# Patient Record
Sex: FEMALE | Race: OTHER | HISPANIC OR LATINO
[De-identification: names, ages, dates, MRNs, and addresses within clinical notes are randomized per-mention and may not be internally consistent; named-entity substitution may affect disease eponyms.]

---

## 2019-05-17 PROBLEM — Z00.00 ENCOUNTER FOR PREVENTIVE HEALTH EXAMINATION: Status: ACTIVE | Noted: 2019-05-17

## 2019-06-03 ENCOUNTER — APPOINTMENT (OUTPATIENT)
Dept: GASTROENTEROLOGY | Facility: CLINIC | Age: 49
End: 2019-06-03

## 2021-09-21 ENCOUNTER — APPOINTMENT (OUTPATIENT)
Dept: RHEUMATOLOGY | Facility: CLINIC | Age: 51
End: 2021-09-21
Payer: COMMERCIAL

## 2021-09-21 VITALS
WEIGHT: 131 LBS | HEIGHT: 62.5 IN | BODY MASS INDEX: 23.5 KG/M2 | SYSTOLIC BLOOD PRESSURE: 110 MMHG | DIASTOLIC BLOOD PRESSURE: 70 MMHG

## 2021-09-21 DIAGNOSIS — Z78.9 OTHER SPECIFIED HEALTH STATUS: ICD-10-CM

## 2021-09-21 DIAGNOSIS — E78.5 HYPERLIPIDEMIA, UNSPECIFIED: ICD-10-CM

## 2021-09-21 DIAGNOSIS — F17.200 NICOTINE DEPENDENCE, UNSPECIFIED, UNCOMPLICATED: ICD-10-CM

## 2021-09-21 DIAGNOSIS — F32.9 MAJOR DEPRESSIVE DISORDER, SINGLE EPISODE, UNSPECIFIED: ICD-10-CM

## 2021-09-21 PROCEDURE — 99204 OFFICE O/P NEW MOD 45 MIN: CPT

## 2021-09-21 RX ORDER — ROSUVASTATIN CALCIUM 20 MG/1
20 TABLET, FILM COATED ORAL
Refills: 0 | Status: ACTIVE | COMMUNITY

## 2021-09-21 RX ORDER — FLUOXETINE HYDROCHLORIDE 20 MG/1
20 CAPSULE ORAL
Refills: 0 | Status: ACTIVE | COMMUNITY

## 2021-09-21 RX ORDER — PREDNISONE 5 MG/1
5 TABLET ORAL
Qty: 21 | Refills: 0 | Status: COMPLETED | COMMUNITY
Start: 2021-09-21 | End: 2021-09-28

## 2021-09-21 NOTE — REVIEW OF SYSTEMS
[Red Eyes] : red eyes [Joint Pain] : joint pain [Joint Stiffness] : joint stiffness [Fever] : no fever [Chills] : no chills [Eye Pain] : no eye pain [Shortness Of Breath] : no shortness of breath [Cough] : no cough [Abdominal Pain] : no abdominal pain [Diarrhea] : no diarrhea [Dysuria] : no dysuria [Skin Lesions] : no skin lesions [FreeTextEntry6] : No pleuritic C/P

## 2021-09-21 NOTE — PHYSICAL EXAM
[General Appearance - Alert] : alert [General Appearance - In No Acute Distress] : in no acute distress [General Appearance - Well Nourished] : well nourished [General Appearance - Well Developed] : well developed [Sclera] : the sclera and conjunctiva were normal [Auscultation Breath Sounds / Voice Sounds] : lungs were clear to auscultation bilaterally [Cervical Lymph Nodes Enlarged Posterior Bilaterally] : posterior cervical [Left] : on the left [Tender] : tender [Rubbery] : rubbery [] : no rash [Motor Exam] : the motor exam was normal [FreeTextEntry1] : There is bilateral GH tenderenss without palpable swelling. There is joint line tenderness of the elbows. There is scattered MCP and PIP joint tenderness without palpable swelling. There is pain on extreme ROM of the hips bilaterally. There is joint line tenderness of the ankles without bogginess or swelling. There is MTP tenderness bilaterally.

## 2021-09-21 NOTE — HISTORY OF PRESENT ILLNESS
[FreeTextEntry1] : Patient is referred for rheumatology evaluation because of about one year of diffuse joint pain with AM stiffness and daytime gelling. Joint most involved are the hips, hands, and low back. There has been no rash or skin photosensitivity, fever, serositis, Raynaud's, hair loss or other associated symptoms. There have been no GI or  symptoms but has had some recent eye redness and irritation at times.

## 2021-09-22 LAB
ALBUMIN SERPL ELPH-MCNC: 4.5 G/DL
ALP BLD-CCNC: 109 U/L
ALT SERPL-CCNC: 29 U/L
ANION GAP SERPL CALC-SCNC: 14 MMOL/L
AST SERPL-CCNC: 20 U/L
BASOPHILS # BLD AUTO: 0.03 K/UL
BASOPHILS NFR BLD AUTO: 0.4 %
BILIRUB SERPL-MCNC: 0.2 MG/DL
BUN SERPL-MCNC: 14 MG/DL
CALCIUM SERPL-MCNC: 9.4 MG/DL
CHLORIDE SERPL-SCNC: 103 MMOL/L
CO2 SERPL-SCNC: 23 MMOL/L
CREAT SERPL-MCNC: 0.62 MG/DL
CRP SERPL-MCNC: <3 MG/L
EOSINOPHIL # BLD AUTO: 0.14 K/UL
EOSINOPHIL NFR BLD AUTO: 2.1 %
ERYTHROCYTE [SEDIMENTATION RATE] IN BLOOD BY WESTERGREN METHOD: 14 MM/HR
GLUCOSE SERPL-MCNC: 91 MG/DL
HCT VFR BLD CALC: 42.9 %
HGB BLD-MCNC: 14.1 G/DL
IMM GRANULOCYTES NFR BLD AUTO: 0.1 %
LYMPHOCYTES # BLD AUTO: 2.18 K/UL
LYMPHOCYTES NFR BLD AUTO: 32.1 %
MAN DIFF?: NORMAL
MCHC RBC-ENTMCNC: 31.6 PG
MCHC RBC-ENTMCNC: 32.9 GM/DL
MCV RBC AUTO: 96.2 FL
MONOCYTES # BLD AUTO: 0.39 K/UL
MONOCYTES NFR BLD AUTO: 5.7 %
NEUTROPHILS # BLD AUTO: 4.04 K/UL
NEUTROPHILS NFR BLD AUTO: 59.6 %
PLATELET # BLD AUTO: 191 K/UL
POTASSIUM SERPL-SCNC: 4.3 MMOL/L
PROT SERPL-MCNC: 6.7 G/DL
RBC # BLD: 4.46 M/UL
RBC # FLD: 12.8 %
RHEUMATOID FACT SER QL: <10 IU/ML
SODIUM SERPL-SCNC: 140 MMOL/L
WBC # FLD AUTO: 6.79 K/UL

## 2021-09-27 LAB
ANA SER IF-ACNC: NEGATIVE
CCP AB SER IA-ACNC: <8 UNITS
DSDNA AB SER-ACNC: <12 IU/ML
ENA RNP AB SER IA-ACNC: <0.2 AL
ENA SM AB SER IA-ACNC: <0.2 AL
ENA SS-A AB SER IA-ACNC: <0.2 AL
ENA SS-B AB SER IA-ACNC: <0.2 AL
HISTONE AB SER QL: 0.3 UNITS
RF+CCP IGG SER-IMP: NEGATIVE

## 2021-10-20 ENCOUNTER — APPOINTMENT (OUTPATIENT)
Dept: RHEUMATOLOGY | Facility: CLINIC | Age: 51
End: 2021-10-20
Payer: COMMERCIAL

## 2021-10-20 VITALS
BODY MASS INDEX: 23.5 KG/M2 | SYSTOLIC BLOOD PRESSURE: 110 MMHG | OXYGEN SATURATION: 99 % | HEIGHT: 62.5 IN | WEIGHT: 131 LBS | TEMPERATURE: 99.1 F | HEART RATE: 71 BPM | DIASTOLIC BLOOD PRESSURE: 62 MMHG

## 2021-10-20 PROCEDURE — 99214 OFFICE O/P EST MOD 30 MIN: CPT

## 2021-10-20 NOTE — HISTORY OF PRESENT ILLNESS
[FreeTextEntry1] : Patient reports she sustained about 80% improvement while on prednisone, and after stopping developed pain in the right hip, but other pain has not recurred as yet.

## 2021-10-20 NOTE — PHYSICAL EXAM
[General Appearance - Alert] : alert [General Appearance - In No Acute Distress] : in no acute distress [General Appearance - Well Developed] : well developed [General Appearance - Well Nourished] : well nourished [Cervical Lymph Nodes Enlarged Posterior Bilaterally] : posterior cervical [Cervical Lymph Nodes Enlarged Anterior Bilaterally] : anterior cervical [] : no rash [Motor Exam] : the motor exam was normal [FreeTextEntry1] : There is pain on ROM of the right hip. No other active synovits is noted.

## 2022-01-12 ENCOUNTER — APPOINTMENT (OUTPATIENT)
Dept: RHEUMATOLOGY | Facility: CLINIC | Age: 52
End: 2022-01-12
Payer: MEDICAID

## 2022-01-12 PROCEDURE — 99214 OFFICE O/P EST MOD 30 MIN: CPT

## 2022-01-12 RX ORDER — PREDNISONE 5 MG/1
5 TABLET ORAL
Qty: 120 | Refills: 1 | Status: DISCONTINUED | COMMUNITY
Start: 2021-10-20 | End: 2022-01-12

## 2022-01-12 NOTE — HISTORY OF PRESENT ILLNESS
[FreeTextEntry1] : Patient reports that after tapering off prednisone joint symptoms did not recur. She ran out of [facility] [place] about one wee ago and did not call for another renewal, and in one week she is noticing a mild recurrence of joint symptoms.

## 2022-01-12 NOTE — PHYSICAL EXAM
[General Appearance - Alert] : alert [General Appearance - In No Acute Distress] : in no acute distress [General Appearance - Well Nourished] : well nourished [General Appearance - Well Developed] : well developed [] : no rash [Motor Exam] : the motor exam was normal [Cervical Lymph Nodes Enlarged Posterior Bilaterally] : posterior cervical [Left] : on the left [Tender] : tender [Rubbery] : rubbery [FreeTextEntry1] : There is pain on ROM of the right hip. No other active synovits is noted.

## 2022-02-08 ENCOUNTER — APPOINTMENT (OUTPATIENT)
Dept: NEUROSURGERY | Facility: CLINIC | Age: 52
End: 2022-02-08

## 2022-02-15 ENCOUNTER — APPOINTMENT (OUTPATIENT)
Dept: NEUROSURGERY | Facility: CLINIC | Age: 52
End: 2022-02-15

## 2022-03-08 ENCOUNTER — APPOINTMENT (OUTPATIENT)
Dept: NEUROSURGERY | Facility: CLINIC | Age: 52
End: 2022-03-08

## 2022-03-29 ENCOUNTER — APPOINTMENT (OUTPATIENT)
Dept: NEUROSURGERY | Facility: CLINIC | Age: 52
End: 2022-03-29
Payer: MEDICAID

## 2022-03-29 PROCEDURE — 99205 OFFICE O/P NEW HI 60 MIN: CPT

## 2022-03-29 NOTE — DATA REVIEWED
[de-identified] : MRI CERVICAL SPINE WITHOUT CONTRAST: 1/4/22: MR CERVICAL SPINE WITHOUT IV CONTRAST \par  \par Noncontrast brain MRI with sagittal T1, FLAIR, axial T1, FLAIR, T2, \par gradient-echo, diffusion and coronal T2 images. \par Noncontrast MRI cervical spine with sagittal T1, T2, STIR and axial \par gradient-echo images. \par History: Chiari malformation. Prior study outside head CT 11/14/2021. \par   \par MRI brain findings: Low, beak cerebellar tonsils consistent with Chiari I \par malformation. Relatively tight appearance at the level of the foramen magnum \par with paucity of cerebrospinal fluid adjacent to the brainstem anteriorly and \par cerebellar tonsils posteriorly. Cerebellar tonsils extend 1.3 cm below the \par foramen magnum. Also demonstrated basilar invagination with high positioning of \par the odontoid extending 7.5 mm superior to Huron's line. There is a short \par appearance of the clivus on sagittal images consistent with developmental \par dysplastic change. \par   \par No evidence intracranial mass or hydrocephalus. Faint/minimal increased T2 \par signal right anterior chantale suggesting minimal small vessel ischemic change \par versus flow related artifact. Diffusion images show no acute stroke. Midline \par structures intact. Grossly unremarkable vascular flow voids skull base region. \par Small maxillary and ethmoid sinus retention cysts. \par   \par MRI BRAIN IMPRESSION: Chiari I malformation with low, beaked cerebellar tonsils. \par Basilar invagination. Tight appearance foramen magnum as described. \par   \par Additional findings as described. \par   \par Cervical spine findings: Again noted low cerebellar tonsils consistent with \par Chiari I malformation and high positioning of the odontoid. No evidence focal \par osseous or spinal cord lesion. There is a wide cervical spinal canal with \par capacious appearance thecal sac especially superiorly. Suggested mild osseous \par dysplastic changes C2 vertebral body on axial images. \par   \par Scattered mild multilevel endplate degenerative changes. Disc space heights \par adequately maintained. \par   \par C2-3 no herniated disc or spinal canal stenosis. \par   \par C3-4 and C4-5 mild disc bulges. \par   \par C5-6 disc bulge/spondylosis with indentation ventral thecal sac. \par   \par C6-7 disc bulge/spondylosis with indentation ventral thecal sac. \par   \par C7-T1 mild disc bulge. \par   \par CERVICAL SPINE IMPRESSION: Chiari I malformation and basilar invagination. \par   \par Developmentally wide cervical spinal canal and capacious thecal sac. Probable \par dysplastic changes C2 vertebral body. \par   \par No evidence cervical spinal cord syrinx. \par   \par Scattered degenerative disc disease. \par   \par MRI BRAIN WITH AND WITHOUT CONTRAST: 1/4/22: MR BRAIN WITHOUT IV CONTRAST \par  \par Noncontrast brain MRI with sagittal T1, FLAIR, axial T1, FLAIR, T2, \par gradient-echo, diffusion and coronal T2 images. \par Noncontrast MRI cervical spine with sagittal T1, T2, STIR and axial \par gradient-echo images. \par History: Chiari malformation. Prior study outside head CT 11/14/2021. \par   \par MRI brain findings: Low, beak cerebellar tonsils consistent with Chiari I \par malformation. Relatively tight appearance at the level of the foramen magnum \par with paucity of cerebrospinal fluid adjacent to the brainstem anteriorly and \par cerebellar tonsils posteriorly. Cerebellar tonsils extend 1.3 cm below the \par foramen magnum. Also demonstrated basilar invagination with high positioning of \par the odontoid extending 7.5 mm superior to Huron's line. There is a short \par appearance of the clivus on sagittal images consistent with developmental \par dysplastic change. \par   \par No evidence intracranial mass or hydrocephalus. Faint/minimal increased T2 \par signal right anterior chantale suggesting minimal small vessel ischemic change \par versus flow related artifact. Diffusion images show no acute stroke. Midline \par structures intact. Grossly unremarkable vascular flow voids skull base region. \par Small maxillary and ethmoid sinus retention cysts. \par   \par MRI BRAIN IMPRESSION: Chiari I malformation with low, beaked cerebellar tonsils. \par Basilar invagination. Tight appearance foramen magnum as described. \par   \par Additional findings as described. \par   \par Cervical spine findings: Again noted low cerebellar tonsils consistent with \par Chiari I malformation and high positioning of the odontoid. No evidence focal \par osseous or spinal cord lesion. There is a wide cervical spinal canal with \par capacious appearance thecal sac especially superiorly. Suggested mild osseous \par dysplastic changes C2 vertebral body on axial images. \par   \par Scattered mild multilevel endplate degenerative changes. Disc space heights \par adequately maintained. \par   \par C2-3 no herniated disc or spinal canal stenosis. \par   \par C3-4 and C4-5 mild disc bulges. \par   \par C5-6 disc bulge/spondylosis with indentation ventral thecal sac. \par   \par C6-7 disc bulge/spondylosis with indentation ventral thecal sac. \par   \par C7-T1 mild disc bulge. \par   \par CERVICAL SPINE IMPRESSION: Chiari I malformation and basilar invagination. \par   \par Developmentally wide cervical spinal canal and capacious thecal sac. Probable \par dysplastic changes C2 vertebral body. \par   \par No evidence cervical spinal cord syrinx. \par   \par Scattered degenerative disc disease.

## 2022-03-29 NOTE — HISTORY OF PRESENT ILLNESS
[de-identified] : Ms. Polanco presents in neurosurgical consultation at the request of. Dr. Jesenia Alejo. She has a PMHx of nontoxic uninodular goiter. hyperlipidemia, depression, anxiety, and current smoker ( 3 cigarettes a day for the last 30 years). In November,  patient had fallen while going down the stairs and was evaluated in the emergency department at [facility]. As per the patient , she reported headaches shortly after her fall and CT head demonstrated an incidental finding of Chiari I malformation and no evidence of hemorrhage or perfusion deficit. We currently do not have these images or reports. She followed up outpatient with neurologist, Dr. Evelio Shelley, which prompted further imaging, MRI brain/C spine from 1/4/22, detailed below. \par \par Today she endorses occasional mild headaches located predominantly in the occipital region that are self limited. She developed bilateral hand paresthesias approxiimately two weeks ago which seem to be more prevalent in the morning with gradual improvement throughout the day. She states that she feels these symptoms are more "psychological" because she is aware of her imaging findings. There are no particular aggravating or alleviating factors. They do not disrupt her activities of daily living. She denies headaches or paresthesias associated with Valsalva maneuvers, hearing loss, blurry vision, visual disturbances, or gait instability. \par \par

## 2022-03-29 NOTE — ASSESSMENT
[FreeTextEntry1] : Ms. 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 She endorses mild, transient headaches and new, mild bilateral hand paresthesias, neither of which are Valsalva-related in nature. MRI brain reviewed independently by me demonstrates evidence for a Chiari I malformation with approximately 1.3mm of tonsillar descent with concomitant imaging evidence for basilar invagination. Ventral CSF is maintained at the foramen magnum. There is no evidence for syringomyelia. \par \par Natural history reviewed.Alternative management strategies discussed. I did explain that her mild headache syndrome is atypical for symptomatic Chiari malformation, and therefore I cannot reliably predict improvement in her symptoms with Chiari decompression. At this time I have recommended obtaining an MRI brain with and without gadolinium with MR brain CSF flow study, as well as a surveillance MRI cervical spine without gadolinium in 6 months with an appointment to follow. The patient understands treatment strategies and has agreed to this plan. \par \par I have asked the patient to contact me for any symptomatic development or progression in the interim at which time we can obtain expedited follow up imaging.\par \par A total of 60 minutes were spent relative to this encounter.\par \par \par \par

## 2022-08-01 ENCOUNTER — APPOINTMENT (OUTPATIENT)
Dept: THORACIC SURGERY | Facility: CLINIC | Age: 52
End: 2022-08-01

## 2022-08-01 VITALS — BODY MASS INDEX: 22.49 KG/M2 | WEIGHT: 135 LBS | HEIGHT: 65 IN

## 2022-08-01 PROCEDURE — G0296 VISIT TO DETERM LDCT ELIG: CPT

## 2022-08-01 NOTE — HISTORY OF PRESENT ILLNESS
[Current] : current smoker [_____ pack-years] : [unfilled] pack-years [TextBox_13] : Referred by Maria Fernanda Ambrosio\par \par DAMIEN GALLEGOS had telephonic visit for a review of eligibility and discussion of the Low dose CT lung cancer screening program. A telephonic visit occurred due to the patient not having access to a smart phone or a computer for an audio/visual visit.  The following was reviewed and confirmed the patient meets screening eligibility criteria.\par -Age 52 year\par Smoking Status:\par -Current smoker\par Pt smoked 12-15 cigarettes for 23 years, then 1 PPD for 1 year, then 5 cigarettes per day for 10 years.\par -Number of pack years smokin\par \par \par Ms. GALLEGOS   denies any signs or symptoms of lung cancer including new cough, changing cough, hemoptysis, and unintentional weight loss. \par \par Ms. GALLEGOS  has history of goiter and inflammatory polyarthopathy. She denies any personal history of lung cancer. Reports no lung cancer in a 1st degree relative. Reports no lung cancer in a 2nd degree relative. Denies any history of lung disease. Denies any  history of  occupational exposures.

## 2022-08-01 NOTE — ASSESSMENT
[Discussed Risks and Advised to Quit Smoking] : Discussed risks and advised to quit smoking [Declined] : Patient has declined cessation intervention [Pre-contemplation] : Pre-contemplation: The patient is not thinking about quitting smoking in the next 6 months [de-identified] : Pt is not ready to quit smoking cigarettes at this time.

## 2022-08-01 NOTE — PLAN
[Smoking Cessation] : smoking cessation [FreeTextEntry1] : Plan:\par \par -Low dose CT chest for lung cancer screening. Maria Fernanda Ambrosio FNP ordered the low dose CT.      \par \par [facility] at [phone]. Unable to reach [facility]. LVM with referrals- need RX and auth faxed to [phone]. DOS provided.\par \par -Follow up with patient and her referring provider after her LDCT results have been reviewed by the multidisciplinary clinical team, if needed.\par \par -Encouraged smoking cessation.\par \par Pt declines referral to CTC or CCX.\par \par Should I screen? tool utilized. 6 Year risk of lung cancer is   0.4%. \par \par Patient wishes to proceed with screening.\par \par Engaged in discussion regarding risks of screening during Covid-19 pandemic and precautions that are being used  to reduce exposure.\par \par Engaged in shared decision making with Ms. GALLEGOS . Answered all questions. She verbalized understanding and agreement. She knows to call back with and questions or concerns.\par

## 2022-08-29 DIAGNOSIS — G93.5 COMPRESSION OF BRAIN: ICD-10-CM

## 2022-09-02 ENCOUNTER — NON-APPOINTMENT (OUTPATIENT)
Age: 52
End: 2022-09-02

## 2022-10-06 ENCOUNTER — APPOINTMENT (OUTPATIENT)
Dept: RHEUMATOLOGY | Facility: CLINIC | Age: 52
End: 2022-10-06

## 2022-10-21 ENCOUNTER — APPOINTMENT (OUTPATIENT)
Dept: PULMONOLOGY | Facility: CLINIC | Age: 52
End: 2022-10-21

## 2022-10-21 VITALS
WEIGHT: 135 LBS | HEART RATE: 67 BPM | TEMPERATURE: 98 F | OXYGEN SATURATION: 97 % | DIASTOLIC BLOOD PRESSURE: 70 MMHG | BODY MASS INDEX: 22.49 KG/M2 | HEIGHT: 65 IN | SYSTOLIC BLOOD PRESSURE: 120 MMHG

## 2022-10-21 PROCEDURE — 99204 OFFICE O/P NEW MOD 45 MIN: CPT

## 2022-10-21 PROCEDURE — 99407 BEHAV CHNG SMOKING > 10 MIN: CPT

## 2022-10-21 RX ORDER — VARENICLINE 0.5 MG/1
0.5 TABLET, FILM COATED ORAL
Qty: 11 | Refills: 0 | Status: ACTIVE | COMMUNITY
Start: 2022-10-21 | End: 1900-01-01

## 2022-10-21 RX ORDER — VARENICLINE 1 MG/1
1 TABLET, FILM COATED ORAL TWICE DAILY
Qty: 60 | Refills: 2 | Status: ACTIVE | COMMUNITY
Start: 2022-10-21 | End: 1900-01-01

## 2022-10-21 NOTE — PHYSICAL EXAM
[No Acute Distress] : no acute distress [Normal Appearance] : normal appearance [No Neck Mass] : no neck mass [Normal Rate/Rhythm] : normal rate/rhythm [Normal S1, S2] : normal s1, s2 [No Resp Distress] : no resp distress [Clear to Auscultation Bilaterally] : clear to auscultation bilaterally [No Abnormalities] : no abnormalities [Normal Gait] : normal gait [No Clubbing] : no clubbing [No Focal Deficits] : no focal deficits [Oriented x3] : oriented x3 [Normal Affect] : normal affect

## 2022-12-22 ENCOUNTER — APPOINTMENT (OUTPATIENT)
Dept: PULMONOLOGY | Facility: CLINIC | Age: 52
End: 2022-12-22

## 2023-01-23 ENCOUNTER — APPOINTMENT (OUTPATIENT)
Dept: RHEUMATOLOGY | Facility: CLINIC | Age: 53
End: 2023-01-23
Payer: MEDICAID

## 2023-01-23 VITALS
HEIGHT: 65 IN | RESPIRATION RATE: 15 BRPM | HEART RATE: 73 BPM | OXYGEN SATURATION: 97 % | WEIGHT: 133 LBS | TEMPERATURE: 98 F | BODY MASS INDEX: 22.16 KG/M2 | DIASTOLIC BLOOD PRESSURE: 70 MMHG | SYSTOLIC BLOOD PRESSURE: 120 MMHG

## 2023-01-23 DIAGNOSIS — M06.4 INFLAMMATORY POLYARTHROPATHY: ICD-10-CM

## 2023-01-23 DIAGNOSIS — R59.0 LOCALIZED ENLARGED LYMPH NODES: ICD-10-CM

## 2023-01-23 DIAGNOSIS — Z79.899 OTHER LONG TERM (CURRENT) DRUG THERAPY: ICD-10-CM

## 2023-01-23 PROCEDURE — 99214 OFFICE O/P EST MOD 30 MIN: CPT

## 2023-01-23 NOTE — HISTORY OF PRESENT ILLNESS
[FreeTextEntry1] : Taking SSZ as Rx'ed; no recurrence of joint pain or stiffness. No recurrence of cervical adenopathy. No progression of symptoms.

## 2023-01-23 NOTE — PHYSICAL EXAM
[General Appearance - Alert] : alert [General Appearance - In No Acute Distress] : in no acute distress [General Appearance - Well Nourished] : well nourished [General Appearance - Well Developed] : well developed [Cervical Lymph Nodes Enlarged Posterior Bilaterally] : posterior cervical [Cervical Lymph Nodes Enlarged Anterior Bilaterally] : anterior cervical [] : no rash [Motor Exam] : the motor exam was normal [FreeTextEntry1] : There is no evidence of active synovitis throughout all joints examined.

## 2023-01-24 LAB
ALBUMIN SERPL ELPH-MCNC: 4 G/DL
ALP BLD-CCNC: 93 U/L
ALT SERPL-CCNC: 27 U/L
ANION GAP SERPL CALC-SCNC: 14 MMOL/L
AST SERPL-CCNC: 22 U/L
BASOPHILS # BLD AUTO: 0.03 K/UL
BASOPHILS NFR BLD AUTO: 0.5 %
BILIRUB SERPL-MCNC: 0.2 MG/DL
BUN SERPL-MCNC: 17 MG/DL
CALCIUM SERPL-MCNC: 9.1 MG/DL
CHLORIDE SERPL-SCNC: 106 MMOL/L
CO2 SERPL-SCNC: 23 MMOL/L
CREAT SERPL-MCNC: 0.67 MG/DL
EGFR: 105 ML/MIN/1.73M2
EOSINOPHIL # BLD AUTO: 0.12 K/UL
EOSINOPHIL NFR BLD AUTO: 1.8 %
GLUCOSE SERPL-MCNC: 117 MG/DL
HCT VFR BLD CALC: 38.9 %
HGB BLD-MCNC: 13.1 G/DL
IMM GRANULOCYTES NFR BLD AUTO: 0.5 %
LYMPHOCYTES # BLD AUTO: 2.59 K/UL
LYMPHOCYTES NFR BLD AUTO: 38.9 %
MAN DIFF?: NORMAL
MCHC RBC-ENTMCNC: 31.9 PG
MCHC RBC-ENTMCNC: 33.7 GM/DL
MCV RBC AUTO: 94.6 FL
MONOCYTES # BLD AUTO: 0.39 K/UL
MONOCYTES NFR BLD AUTO: 5.9 %
NEUTROPHILS # BLD AUTO: 3.5 K/UL
NEUTROPHILS NFR BLD AUTO: 52.4 %
PLATELET # BLD AUTO: 168 K/UL
POTASSIUM SERPL-SCNC: 4.1 MMOL/L
PROT SERPL-MCNC: 6 G/DL
RBC # BLD: 4.11 M/UL
RBC # FLD: 12.4 %
SODIUM SERPL-SCNC: 143 MMOL/L
WBC # FLD AUTO: 6.66 K/UL

## 2023-02-09 ENCOUNTER — APPOINTMENT (OUTPATIENT)
Dept: PULMONOLOGY | Facility: CLINIC | Age: 53
End: 2023-02-09
Payer: MEDICAID

## 2023-02-09 VITALS
OXYGEN SATURATION: 97 % | DIASTOLIC BLOOD PRESSURE: 60 MMHG | HEART RATE: 65 BPM | TEMPERATURE: 96.6 F | BODY MASS INDEX: 22.16 KG/M2 | WEIGHT: 133 LBS | HEIGHT: 65 IN | SYSTOLIC BLOOD PRESSURE: 100 MMHG

## 2023-02-09 DIAGNOSIS — R91.8 OTHER NONSPECIFIC ABNORMAL FINDING OF LUNG FIELD: ICD-10-CM

## 2023-02-09 PROCEDURE — 99406 BEHAV CHNG SMOKING 3-10 MIN: CPT

## 2023-02-09 PROCEDURE — 99212 OFFICE O/P EST SF 10 MIN: CPT | Mod: 25

## 2023-02-09 NOTE — PHYSICAL EXAM
[No Acute Distress] : no acute distress [No Resp Distress] : no resp distress [No Acc Muscle Use] : no acc muscle use [No Abnormalities] : no abnormalities [No Focal Deficits] : no focal deficits [Oriented x3] : oriented x3 [Normal Affect] : normal affect

## 2023-02-09 NOTE — COUNSELING
[Cessation strategies including cessation program discussed] : Cessation strategies including cessation program discussed [Use of nicotine replacement therapies and other medications discussed] : Use of nicotine replacement therapies and other medications discussed [Encouraged to pick a quit date and identify support needed to quit] : Encouraged to pick a quit date and identify support needed to quit [Smoking Cessation Program Referral] : Smoking Cessation Program Referral  [FreeTextEntry1] : 5

## 2023-02-09 NOTE — HISTORY OF PRESENT ILLNESS
[TextBox_4] : 52 year old female smoker with lung nodules came in for f/u.\par Last seen in Oct 2022.\par \par She still smokes.\par Did not start Chantix because she did not know how to take it.\par No respiratory complaints\par D/w her about smoking cessation and how to use Chantix.\par \par

## 2023-08-09 ENCOUNTER — RX RENEWAL (OUTPATIENT)
Age: 53
End: 2023-08-09

## 2023-08-09 RX ORDER — SULFASALAZINE 500 MG/1
500 TABLET ORAL
Qty: 180 | Refills: 1 | Status: ACTIVE | COMMUNITY
Start: 2021-10-20 | End: 1900-01-01

## 2023-08-23 ENCOUNTER — APPOINTMENT (OUTPATIENT)
Dept: THORACIC SURGERY | Facility: CLINIC | Age: 53
End: 2023-08-23
Payer: MEDICAID

## 2023-08-23 VITALS — BODY MASS INDEX: 23.92 KG/M2 | HEIGHT: 63 IN | WEIGHT: 135 LBS

## 2023-08-23 DIAGNOSIS — F17.210 NICOTINE DEPENDENCE, CIGARETTES, UNCOMPLICATED: ICD-10-CM

## 2023-08-23 PROCEDURE — G0296 VISIT TO DETERM LDCT ELIG: CPT

## 2023-08-23 NOTE — HISTORY OF PRESENT ILLNESS
[Home] : at home, [unfilled] , at the time of the visit. [Other Location: e.g. Home (Enter Location, City,State)___] : at [unfilled] [Verbal consent obtained from patient] : the patient, [unfilled] [Interpreters_IDNumber] : [id number] [Current] : Current [TextBox_13] : Responded to reminder PCP Maria Fernanda Ambrosio  [facility] Pulmon[facility] Dr. Kong QUEZADA GALLEGOS had telephonic visit for a review of eligibility and discussion of the Low dose CT lung cancer screening program. A telephonic visit occurred due to the patient not having access to a smart phone or a computer for an audio/visual visit. The following was reviewed and confirmed the patient meets screening eligibility criteria.  Smoking Status: -Current smoker Pt smoked 12-15 cigarettes for 23 years, then 1 PPD for 1 year, then 5 cigarettes per day for 11 years. -Number of pack years smokin  Ms. GALLEGOS denies any signs or symptoms of lung cancer including new cough, changing cough, hemoptysis, and unintentional weight loss.  Ms. GALLEGOS has history of goiter and inflammatory polyarthopathy. denies DX   COPD, Covid infection, heart disease, connective tissue disease, and any personal history of cancer.  Reports no lung cancer in a 1st degree relative. Reports no lung cancer in a 2nd degree relative.  Denies any history of occupational exposures.  Denies exposure to 2nd hand smoke.  [PacksperYear] : 21

## 2023-08-23 NOTE — DATA REVIEWED
[Lung Cancer Screening] : Patient underwent lung cancer screening [2] : 2 [TextBox_12] : 08/22 [TextBox_52] : 1

## 2023-08-23 NOTE — ASSESSMENT
[Declined] : Patient has declined cessation intervention [de-identified] : Acknowledged how  difficult it is to quit smoking. Advised that quitting smoking is the most important thing a person can do for their health. She is not ready to discuss smoking cessation intervention at this time.

## 2023-08-23 NOTE — PLAN
[Smoking Cessation Guidance Provided] : Smoking cessation guidance was provided to patient [Smoking Cessation] : smoking cessation [FreeTextEntry1] : Plan:  -Low dose CT chest for lung cancer screening. MIKKI ALEXANDER ordered the low dose CT.        Pt to fax the rx to radiology   -Will follow up her referring provider after her LDCT results have been reviewed by the multidisciplinary clinical team, if needed.   -Encouraged smoking cessation.   -Patient declines referral to CTC and CCX   Should I screen? tool utilized. 6 Year risk of lung cancer is   0.3%.   Patient wishes to proceed with screening.  Engaged in discussion regarding risks of screening during Covid-19 pandemic and precautions that are being used  to reduce exposure.  Engaged in shared decision making with Ms. GALLEGOS . Answered all questions. She verbalized understanding and agreement. She knows to call back with and questions or concerns.

## 2024-01-23 ENCOUNTER — OFFICE (OUTPATIENT)
Dept: URBAN - METROPOLITAN AREA CLINIC 122 | Facility: CLINIC | Age: 54
Setting detail: OPHTHALMOLOGY
End: 2024-01-23
Payer: COMMERCIAL

## 2024-01-23 ENCOUNTER — RX ONLY (RX ONLY)
Age: 54
End: 2024-01-23

## 2024-01-23 DIAGNOSIS — H11.32: ICD-10-CM

## 2024-01-23 DIAGNOSIS — H16.223: ICD-10-CM

## 2024-01-23 PROBLEM — H11.153 PINGUECULA; BOTH EYES: Status: ACTIVE | Noted: 2024-01-23

## 2024-01-23 PROCEDURE — 92002 INTRM OPH EXAM NEW PATIENT: CPT | Performed by: OPHTHALMOLOGY

## 2024-01-23 ASSESSMENT — TEAR BREAK UP TIME (TBUT)
OD_TBUT: 2+
OS_TBUT: 2+

## 2024-01-23 ASSESSMENT — REFRACTION_CURRENTRX
OS_ADD: +1.50
OS_OVR_VA: 20/
OS_SPHERE: +1.00
OD_SPHERE: +1.00
OD_ADD: +1.50
OD_OVR_VA: 20/

## 2024-01-23 ASSESSMENT — CONFRONTATIONAL VISUAL FIELD TEST (CVF)
OD_FINDINGS: FULL
OS_FINDINGS: FULL

## 2024-01-29 ENCOUNTER — APPOINTMENT (OUTPATIENT)
Dept: RHEUMATOLOGY | Facility: CLINIC | Age: 54
End: 2024-01-29

## 2024-02-19 ENCOUNTER — OFFICE (OUTPATIENT)
Dept: URBAN - METROPOLITAN AREA CLINIC 122 | Facility: CLINIC | Age: 54
Setting detail: OPHTHALMOLOGY
End: 2024-02-19
Payer: COMMERCIAL

## 2024-02-19 DIAGNOSIS — H52.4: ICD-10-CM

## 2024-02-19 DIAGNOSIS — H16.223: ICD-10-CM

## 2024-02-19 PROBLEM — H52.7 REFRACTIVE ERROR: Status: ACTIVE | Noted: 2024-02-19

## 2024-02-19 PROCEDURE — 92014 COMPRE OPH EXAM EST PT 1/>: CPT | Performed by: OPHTHALMOLOGY

## 2024-02-19 PROCEDURE — 92015 DETERMINE REFRACTIVE STATE: CPT | Performed by: OPHTHALMOLOGY

## 2024-02-19 ASSESSMENT — CONFRONTATIONAL VISUAL FIELD TEST (CVF)
OD_FINDINGS: FULL
OS_FINDINGS: FULL

## 2024-02-19 ASSESSMENT — TEAR BREAK UP TIME (TBUT)
OS_TBUT: 2+
OD_TBUT: 2+

## 2024-02-19 ASSESSMENT — REFRACTION_CURRENTRX
OS_OVR_VA: 20/
OD_SPHERE: +1.00
OD_OVR_VA: 20/
OS_SPHERE: +1.00
OS_ADD: +1.50
OD_ADD: +1.50

## 2024-02-19 ASSESSMENT — SPHEQUIV_DERIVED
OS_SPHEQUIV: 1.375
OD_SPHEQUIV: 1.625

## 2024-02-19 ASSESSMENT — REFRACTION_AUTOREFRACTION
OD_AXIS: 80
OS_CYLINDER: -0.25
OS_AXIS: 111
OS_SPHERE: +1.50
OD_CYLINDER: -0.25
OD_SPHERE: +1.75

## 2024-02-19 ASSESSMENT — REFRACTION_MANIFEST
OD_SPHERE: +1.00
OS_ADD: +2.00
OS_SPHERE: +1.00
OD_ADD: +2.00

## 2024-08-01 ENCOUNTER — APPOINTMENT (OUTPATIENT)
Dept: OBGYN | Facility: CLINIC | Age: 54
End: 2024-08-01
Payer: COMMERCIAL

## 2024-08-01 VITALS
DIASTOLIC BLOOD PRESSURE: 64 MMHG | BODY MASS INDEX: 22.32 KG/M2 | SYSTOLIC BLOOD PRESSURE: 104 MMHG | HEIGHT: 63 IN | WEIGHT: 126 LBS

## 2024-08-01 DIAGNOSIS — Z01.419 ENCOUNTER FOR GYNECOLOGICAL EXAMINATION (GENERAL) (ROUTINE) W/OUT ABNORMAL FINDINGS: ICD-10-CM

## 2024-08-01 DIAGNOSIS — Z12.31 ENCOUNTER FOR SCREENING MAMMOGRAM FOR MALIGNANT NEOPLASM OF BREAST: ICD-10-CM

## 2024-08-01 DIAGNOSIS — Z12.4 ENCOUNTER FOR SCREENING FOR MALIGNANT NEOPLASM OF CERVIX: ICD-10-CM

## 2024-08-01 DIAGNOSIS — Z11.51 ENCOUNTER FOR SCREENING FOR HUMAN PAPILLOMAVIRUS (HPV): ICD-10-CM

## 2024-08-01 PROCEDURE — 99386 PREV VISIT NEW AGE 40-64: CPT

## 2024-08-01 NOTE — HISTORY OF PRESENT ILLNESS
[FreeTextEntry1] : 53 y/o  postmenopausal without HRT presents for annual GYN exam.  Patient declined .   x 2  ETOP D&C.   and sexually active.  LMP~ 4years ago.  No postmenopausal bleeding.  Complains of a painless bulge in her vagina, that she noticed when she looked in the mirror had a groin pimple.  Overall good health.  No history of cervical dysplasia.  Last pap about three years ago.  Mammogram 24- BIRAD 1  Bone density 2023-osteoporosis of the spine, normal hip.  Followed by primary provider.  Colonoscopy about three years ago, reports normal.  Denies FH of ca of ovary, colon, breast or uterus.  Lives with  and children 27 & 19 (sons).  Pt works in Housekeeping.

## 2024-08-01 NOTE — PLAN
[FreeTextEntry1] : Discussed rectocele/cystocele findings with patient.  Written information in Dutch provided.  Options for management should they start to bother patient.

## 2024-08-01 NOTE — PLAN
[FreeTextEntry1] : Discussed rectocele/cystocele findings with patient.  Written information in [place] provided.  Options for management should they start to bother patient.

## 2024-08-01 NOTE — COUNSELING
[Breast Self Exam] : breast self exam [Other ___] : [unfilled] [Yes] : Risk of tobacco use and health benefits of smoking cessation discussed: Yes [No] : Not willing to quit smoking

## 2024-08-01 NOTE — HISTORY OF PRESENT ILLNESS
[FreeTextEntry1] : 55 y/o  postmenopausal without HRT presents for annual GYN exam.  Patient declined .   x 2  ETOP D&C.   and sexually active.  LMP~ 4years ago.  No postmenopausal bleeding.  Complains of a painless bulge in her vagina, that she noticed when she looked in the mirror had a groin pimple.  Overall good health.  No history of cervical dysplasia.  Last pap about three years ago.  Mammogram 24- BIRAD 1  Bone density 2023-osteoporosis of the spine, normal hip.  Followed by primary provider.  Colonoscopy about three years ago, reports normal.  Denies FH of ca of ovary, colon, breast or uterus.  Lives with  and children 27 & 19 (sons).  Pt works in Housekeeping.

## 2024-08-01 NOTE — PHYSICAL EXAM
[Chaperone Declined] : Patient declined chaperone [Appropriately responsive] : appropriately responsive [Alert] : alert [No Acute Distress] : no acute distress [Soft] : soft [Non-tender] : non-tender [Non-distended] : non-distended [No HSM] : No HSM [No Mass] : no mass [Oriented x3] : oriented x3 [Examination Of The Breasts] : a normal appearance [No Discharge] : no discharge [No Masses] : no breast masses were palpable [No Lesions] : no lesions  [Labia Majora] : normal [Labia Minora] : normal [Cystocele] : a cystocele [Pink Rugae] : pink rugae [Rectocele] : a rectocele [Normal] : normal [Uterine Adnexae] : normal [Tenderness] : nontender [Enlarged ___ wks] : not enlarged [FreeTextEntry5] : No CMT [FreeTextEntry8] : No adnexal masses or tenderness

## 2024-08-05 LAB — HPV HIGH+LOW RISK DNA PNL CVX: NOT DETECTED

## 2024-08-08 LAB — CYTOLOGY CVX/VAG DOC THIN PREP: ABNORMAL

## 2024-10-23 ENCOUNTER — APPOINTMENT (OUTPATIENT)
Dept: GASTROENTEROLOGY | Facility: CLINIC | Age: 54
End: 2024-10-23
Payer: COMMERCIAL

## 2024-10-23 VITALS
HEART RATE: 75 BPM | WEIGHT: 130 LBS | SYSTOLIC BLOOD PRESSURE: 118 MMHG | DIASTOLIC BLOOD PRESSURE: 70 MMHG | OXYGEN SATURATION: 97 % | BODY MASS INDEX: 23.04 KG/M2 | HEIGHT: 63 IN

## 2024-10-23 DIAGNOSIS — Z12.11 ENCOUNTER FOR SCREENING FOR MALIGNANT NEOPLASM OF COLON: ICD-10-CM

## 2024-10-23 DIAGNOSIS — K21.9 GASTRO-ESOPHAGEAL REFLUX DISEASE W/OUT ESOPHAGITIS: ICD-10-CM

## 2024-10-23 PROCEDURE — 99203 OFFICE O/P NEW LOW 30 MIN: CPT

## 2024-10-23 PROCEDURE — G2211 COMPLEX E/M VISIT ADD ON: CPT

## 2024-11-06 ENCOUNTER — APPOINTMENT (OUTPATIENT)
Dept: THORACIC SURGERY | Facility: CLINIC | Age: 54
End: 2024-11-06
Payer: COMMERCIAL

## 2024-11-06 VITALS — WEIGHT: 130 LBS | HEIGHT: 63 IN | BODY MASS INDEX: 23.04 KG/M2

## 2024-11-06 DIAGNOSIS — F17.210 NICOTINE DEPENDENCE, CIGARETTES, UNCOMPLICATED: ICD-10-CM

## 2024-11-06 PROCEDURE — ACP01: CPT | Mod: NC

## 2024-11-14 ENCOUNTER — APPOINTMENT (OUTPATIENT)
Dept: RHEUMATOLOGY | Facility: CLINIC | Age: 54
End: 2024-11-14
Payer: COMMERCIAL

## 2024-11-14 VITALS
WEIGHT: 132 LBS | DIASTOLIC BLOOD PRESSURE: 60 MMHG | OXYGEN SATURATION: 98 % | HEIGHT: 63 IN | BODY MASS INDEX: 23.39 KG/M2 | HEART RATE: 74 BPM | SYSTOLIC BLOOD PRESSURE: 102 MMHG

## 2024-11-14 DIAGNOSIS — G56.03 CARPAL TUNNEL SYNDROM,BILATERAL UPPER LIMBS: ICD-10-CM

## 2024-11-14 DIAGNOSIS — M06.4 INFLAMMATORY POLYARTHROPATHY: ICD-10-CM

## 2024-11-14 PROCEDURE — 99214 OFFICE O/P EST MOD 30 MIN: CPT

## 2024-11-14 PROCEDURE — G2211 COMPLEX E/M VISIT ADD ON: CPT

## 2024-11-14 RX ORDER — SULFASALAZINE 500 MG/1
500 TABLET, DELAYED RELEASE ORAL
Qty: 180 | Refills: 3 | Status: ACTIVE | COMMUNITY
Start: 2024-11-14 | End: 1900-01-01

## 2024-11-26 ENCOUNTER — RESULT REVIEW (OUTPATIENT)
Age: 54
End: 2024-11-26

## 2024-11-26 ENCOUNTER — APPOINTMENT (OUTPATIENT)
Dept: GASTROENTEROLOGY | Facility: HOSPITAL | Age: 54
End: 2024-11-26

## 2024-12-17 ENCOUNTER — OFFICE (OUTPATIENT)
Facility: LOCATION | Age: 54
Setting detail: OPHTHALMOLOGY
End: 2024-12-17
Payer: COMMERCIAL

## 2024-12-17 DIAGNOSIS — H15.101: ICD-10-CM

## 2024-12-17 DIAGNOSIS — H16.223: ICD-10-CM

## 2024-12-17 PROCEDURE — 92012 INTRM OPH EXAM EST PATIENT: CPT | Performed by: OPHTHALMOLOGY

## 2024-12-17 ASSESSMENT — CONFRONTATIONAL VISUAL FIELD TEST (CVF)
OD_FINDINGS: FULL
OS_FINDINGS: FULL

## 2024-12-17 ASSESSMENT — VISUAL ACUITY
OD_BCVA: 20/20-1
OS_BCVA: 20/20-2

## 2024-12-17 ASSESSMENT — REFRACTION_AUTOREFRACTION
OS_SPHERE: +1.50
OS_CYLINDER: -0.25
OD_AXIS: 80
OD_CYLINDER: -0.25
OS_AXIS: 111
OD_SPHERE: +1.75

## 2024-12-17 ASSESSMENT — REFRACTION_MANIFEST
OD_ADD: +2.00
OS_SPHERE: +1.00
OD_SPHERE: +1.00
OS_ADD: +2.00

## 2024-12-17 ASSESSMENT — TEAR BREAK UP TIME (TBUT)
OS_TBUT: 2+
OD_TBUT: 2+

## 2024-12-17 ASSESSMENT — REFRACTION_CURRENTRX
OD_SPHERE: +1.00
OS_ADD: +1.50
OD_OVR_VA: 20/
OS_OVR_VA: 20/
OD_ADD: +1.50
OS_SPHERE: +1.00

## 2025-01-27 ENCOUNTER — APPOINTMENT (OUTPATIENT)
Dept: ORTHOPEDIC SURGERY | Facility: CLINIC | Age: 55
End: 2025-01-27
Payer: COMMERCIAL

## 2025-01-27 VITALS
DIASTOLIC BLOOD PRESSURE: 72 MMHG | BODY MASS INDEX: 23.39 KG/M2 | SYSTOLIC BLOOD PRESSURE: 121 MMHG | OXYGEN SATURATION: 98 % | HEART RATE: 85 BPM | WEIGHT: 132 LBS | HEIGHT: 63 IN

## 2025-01-27 DIAGNOSIS — M75.82 OTHER SHOULDER LESIONS, LEFT SHOULDER: ICD-10-CM

## 2025-01-27 PROCEDURE — 20610 DRAIN/INJ JOINT/BURSA W/O US: CPT | Mod: LT

## 2025-01-27 PROCEDURE — 73030 X-RAY EXAM OF SHOULDER: CPT | Mod: LT

## 2025-01-27 PROCEDURE — 99204 OFFICE O/P NEW MOD 45 MIN: CPT | Mod: 25

## 2025-01-28 ENCOUNTER — APPOINTMENT (OUTPATIENT)
Dept: ORTHOPEDIC SURGERY | Facility: CLINIC | Age: 55
End: 2025-01-28